# Patient Record
Sex: FEMALE | Race: WHITE | NOT HISPANIC OR LATINO | Employment: FULL TIME | URBAN - METROPOLITAN AREA
[De-identification: names, ages, dates, MRNs, and addresses within clinical notes are randomized per-mention and may not be internally consistent; named-entity substitution may affect disease eponyms.]

---

## 2021-05-06 ENCOUNTER — PATIENT MESSAGE (OUTPATIENT)
Dept: RESEARCH | Facility: HOSPITAL | Age: 36
End: 2021-05-06

## 2022-10-11 ENCOUNTER — OFFICE VISIT (OUTPATIENT)
Dept: URGENT CARE | Facility: CLINIC | Age: 37
End: 2022-10-11
Payer: MEDICAID

## 2022-10-11 VITALS
WEIGHT: 190 LBS | HEIGHT: 63 IN | TEMPERATURE: 98 F | HEART RATE: 92 BPM | DIASTOLIC BLOOD PRESSURE: 81 MMHG | OXYGEN SATURATION: 99 % | RESPIRATION RATE: 16 BRPM | SYSTOLIC BLOOD PRESSURE: 125 MMHG | BODY MASS INDEX: 33.66 KG/M2

## 2022-10-11 DIAGNOSIS — M54.32 SCIATICA OF LEFT SIDE: Primary | ICD-10-CM

## 2022-10-11 PROCEDURE — 3079F PR MOST RECENT DIASTOLIC BLOOD PRESSURE 80-89 MM HG: ICD-10-PCS | Mod: CPTII,S$GLB,, | Performed by: NURSE PRACTITIONER

## 2022-10-11 PROCEDURE — 1159F MED LIST DOCD IN RCRD: CPT | Mod: CPTII,S$GLB,, | Performed by: NURSE PRACTITIONER

## 2022-10-11 PROCEDURE — 3008F BODY MASS INDEX DOCD: CPT | Mod: CPTII,S$GLB,, | Performed by: NURSE PRACTITIONER

## 2022-10-11 PROCEDURE — 3008F PR BODY MASS INDEX (BMI) DOCUMENTED: ICD-10-PCS | Mod: CPTII,S$GLB,, | Performed by: NURSE PRACTITIONER

## 2022-10-11 PROCEDURE — 99204 PR OFFICE/OUTPT VISIT, NEW, LEVL IV, 45-59 MIN: ICD-10-PCS | Mod: S$GLB,,, | Performed by: NURSE PRACTITIONER

## 2022-10-11 PROCEDURE — 1159F PR MEDICATION LIST DOCUMENTED IN MEDICAL RECORD: ICD-10-PCS | Mod: CPTII,S$GLB,, | Performed by: NURSE PRACTITIONER

## 2022-10-11 PROCEDURE — 3074F PR MOST RECENT SYSTOLIC BLOOD PRESSURE < 130 MM HG: ICD-10-PCS | Mod: CPTII,S$GLB,, | Performed by: NURSE PRACTITIONER

## 2022-10-11 PROCEDURE — 3074F SYST BP LT 130 MM HG: CPT | Mod: CPTII,S$GLB,, | Performed by: NURSE PRACTITIONER

## 2022-10-11 PROCEDURE — 99204 OFFICE O/P NEW MOD 45 MIN: CPT | Mod: S$GLB,,, | Performed by: NURSE PRACTITIONER

## 2022-10-11 PROCEDURE — 3079F DIAST BP 80-89 MM HG: CPT | Mod: CPTII,S$GLB,, | Performed by: NURSE PRACTITIONER

## 2022-10-11 RX ORDER — BUTALBITAL, ACETAMINOPHEN AND CAFFEINE 50; 325; 40 MG/1; MG/1; MG/1
1 CAPSULE ORAL EVERY 4 HOURS PRN
COMMUNITY
Start: 2022-08-10

## 2022-10-11 RX ORDER — METHOCARBAMOL 500 MG/1
500 TABLET, FILM COATED ORAL 4 TIMES DAILY
Qty: 20 TABLET | Refills: 0 | Status: SHIPPED | OUTPATIENT
Start: 2022-10-11 | End: 2022-10-21

## 2022-10-11 RX ORDER — KETOROLAC TROMETHAMINE 30 MG/ML
30 INJECTION, SOLUTION INTRAMUSCULAR; INTRAVENOUS
Status: COMPLETED | OUTPATIENT
Start: 2022-10-11 | End: 2022-10-11

## 2022-10-11 RX ORDER — BUPROPION HYDROCHLORIDE 150 MG/1
150 TABLET, EXTENDED RELEASE ORAL 2 TIMES DAILY
COMMUNITY
Start: 2022-10-07

## 2022-10-11 RX ORDER — ERGOCALCIFEROL 1.25 MG/1
50000 CAPSULE ORAL
COMMUNITY
Start: 2022-09-27 | End: 2023-03-09

## 2022-10-11 RX ORDER — KETOROLAC TROMETHAMINE 30 MG/ML
30 INJECTION, SOLUTION INTRAMUSCULAR; INTRAVENOUS
Status: DISCONTINUED | OUTPATIENT
Start: 2022-10-11 | End: 2022-10-11

## 2022-10-11 RX ADMIN — KETOROLAC TROMETHAMINE 30 MG: 30 INJECTION, SOLUTION INTRAMUSCULAR; INTRAVENOUS at 03:10

## 2022-10-11 NOTE — PROGRESS NOTES
"Subjective:       Patient ID: Estefany Parker is a 37 y.o. female.    Vitals:  height is 5' 3" (1.6 m) and weight is 86.2 kg (190 lb). Her tympanic temperature is 98.3 °F (36.8 °C). Her blood pressure is 125/81 and her pulse is 92. Her respiration is 16 and oxygen saturation is 99%.     Chief Complaint: Sciatica    37-year-old female with a history of sciatica reports with complaints of left lower back pain that radiates down left leg.  Patient reports as a pulling type pain that began yesterday morning.  She has been placing both heat and ice to affected area with moderately relief.  Denies changes in bowel, bladder pattern, saddle anesthesia.    Other  This is a chronic problem. The current episode started yesterday. The problem occurs constantly. The problem has been gradually improving. Pertinent negatives include no chest pain, coughing, fever, headaches, nausea, neck pain, numbness or vomiting. Associated symptoms comments: Left lower back down left leg, had issues raising left shoulder, no pain in shoulder, limited range of motion. The symptoms are aggravated by standing and walking (sitting). She has tried heat and ice (aleve back and body) for the symptoms. The treatment provided moderate relief.     Constitution: Negative for activity change, appetite change and fever.   Neck: Negative for neck pain.   Cardiovascular:  Negative for chest pain and palpitations.   Respiratory:  Negative for cough.    Gastrointestinal:  Negative for nausea, vomiting and diarrhea.   Musculoskeletal:  Positive for back pain.   Neurological:  Negative for headaches and numbness.     Objective:      Physical Exam   Constitutional:  Non-toxic appearance. She does not appear ill. No distress. normal  HENT:   Nose: Nose normal.   Mouth/Throat: Mucous membranes are moist.   Pulmonary/Chest: Effort normal. No stridor. No respiratory distress. She has no wheezes. She has no rhonchi. She has no rales. She exhibits no tenderness. "   Abdominal: Bowel sounds are normal.   Musculoskeletal:         General: Tenderness (radiates to left leg) present.   Neurological: no focal deficit. She is alert.   Skin: Skin is not diaphoretic. Capillary refill takes less than 2 seconds.   Nursing note and vitals reviewed.      Assessment:       1. Sciatica of left side          Plan:         Sciatica of left side    Other orders  -     Discontinue: ketorolac injection 30 mg  -     ketorolac injection 30 mg       Sciatica Discharge Instructions   About this topic   You may have pain, weakness, numbness, and tingling that runs from your buttocks down the back of your leg to your feet. This is called sciatica. Your sciatic nerve is a large nerve that starts in your lower back. It runs all the way down the back of your leg. You may have something like a disc or bone spur pressing on this nerve. When something is pressing on or bothering this nerve, it can cause sciatica. This is the medical name for pain, weakness, numbness, or tingling that goes from your buttock down your leg towards your heel. You can have sciatic pain on one side or on both sides. Most of the time, it will get better without surgery.     What care is needed at home?   Ask the doctor what you need to do when you go home. Make sure you ask questions if you do not understand what the doctor says. This way you will know what to do.  Stay as active as you can without causing too much pain. It is OK to rest your back for a day or so. Be sure to get up and move around gently during the day as you are able. After a few days, slowly start to increase your activity level as you are able to. If something causes your pain to come back or get worse, stop and go back to doing easier activities that did not hurt.  Do not sit or  one position for a long time. You may want to sleep with a pillow under or between your knees if this eases your pain.  You may want to take medicines like ibuprofen or  naproxen for swelling and pain. These are nonsteroidal anti-inflammatory drugs (NSAIDS).  What follow-up care is needed?   Your doctor may ask you to make visits to the office to check on your progress. Be sure to keep these visits.  Your doctor may send you to physical therapy (PT) or a chiropractor for treatments to lessen pain and to learn the right exercises to do.  Your doctor may also send you to a neurologist. This is a doctor who specializes in treating nerve problems.  If you do not get better with treatment, your doctor may need to send you to an orthopedic surgeon.  What drugs may be needed?   The doctor may order drugs to:  Help with pain and swelling  The doctor may give you a shot of an anti-inflammatory drug called a corticosteroid. This will help with swelling. Talk with your doctor about the risks of this shot.  Will physical activity be limited?   You may need to rest for a while. You should not do physical activity that makes your health problem worse. Talk to your doctor if you run, work out, or play sports. You may not be able to do those things until your health problem get better.  What problems could happen?   Long-term back pain  Loss of feeling or movement in the legs or feet  Weight gain, less muscle strength and flexibility, weaker bones  Need for surgery  Infection  Loss of bowel and bladder function  What can be done to prevent this health problem?   Stay active and work out to keep your muscles strong and flexible. Warm up slowly and stretch before you exercise.  Use good posture.  Use proper ways to lift and bend:  Spread your feet apart so you have a good base of support. Then, bend with your knees when you  something from the ground.  When lifting and moving an object, keep your back straight. Keep the object as close to your body as possible. Do not twist. Instead, move your feet to the direction you are going.  Take breaks often when seated for long periods of time. Get up  and walk around from time to time.  If you stand for long periods, put one leg up on a small stool for a while. Then, change legs.  If you sleep on your side, put a pillow in between your knees to keep your back and legs in a good position.  Use good supportive footwear. Avoid high heels.  Keep a healthy weight.  When do I need to call the doctor?   You are unable to walk or start having trouble controlling your bowels or bladder.  You get new or worsening pain, numbness, or weakness that spreads to both legs.  Your pain is getting worse, even with medicines and rest.  You are not able do your normal activities because of the pain.  Helpful tips   Water exercise or biking may help you stay in shape without making your problem worse.  The right exercises for sciatica will depend on what the problem is that is causing the pain. Talk with your doctor about which stretches are best for you.  Stretching may be slightly painful but should never give sharp pains. If it is painful, ease up until you only feel mild stretching. All stretching exercises should be held for 20 to 30 seconds to be most helpful. Repeat 2 to 3 times. Do 2 to 3 times each day to get the best results.  Lie on your back. Bend up the knee of the painful side until your foot is even with the other knee. Keeping your shoulders down, slowly drop the bent knee across the other leg. Do this until you feel a stretch in your buttocks.  Lie on your back with your knees bent and feet flat on the ground. If the problem is on your right leg, cross your right ankle onto your left thigh just above the knee. Reach your right arm in between your thighs and clasp your hands around your left thigh. Slowly pull the left thigh up towards your chest until you feel stretching in the right buttock.  Teach Back: Helping You Understand   The Teach Back Method helps you understand the information we are giving you. After you talk with the staff, tell them in your own words  what you learned. This helps to make sure the staff has described each thing clearly. It also helps to explain things that may have been confusing. Before going home, make sure you can do these:  I can tell you about my condition.  I can tell you what may help ease my pain.  I can tell you what I will do if I have more pain or numbness in my leg or foot.  Where can I learn more?   American Academy of Family Physicians  https://familydoctor.org/condition/piriformis-syndrome/   Health Navigator [facility]  https://www.healthnavigator.org.nz/health-a-z/s/sciatica/   [facility]  https://www.nhs.uk/conditions/sciatica/   Last Reviewed Date   2021-06-21  Consumer Information Use and Disclaimer   This information is not specific medical advice and does not replace information you receive from your health care provider. This is only a brief summary of general information. It does NOT include all information about conditions, illnesses, injuries, tests, procedures, treatments, therapies, discharge instructions or life-style choices that may apply to you. You must talk with your health care provider for complete information about your health and treatment options. This information should not be used to decide whether or not to accept your health care providers advice, instructions or recommendations. Only your health care provider has the knowledge and training to provide advice that is right for you.  Copyright   Copyright © 2021 [employer]. and its affiliates and/or licensors. All rights reserved.

## 2022-10-11 NOTE — PATIENT INSTRUCTIONS
Sciatica Discharge Instructions   About this topic   You may have pain, weakness, numbness, and tingling that runs from your buttocks down the back of your leg to your feet. This is called sciatica. Your sciatic nerve is a large nerve that starts in your lower back. It runs all the way down the back of your leg. You may have something like a disc or bone spur pressing on this nerve. When something is pressing on or bothering this nerve, it can cause sciatica. This is the medical name for pain, weakness, numbness, or tingling that goes from your buttock down your leg towards your heel. You can have sciatic pain on one side or on both sides. Most of the time, it will get better without surgery.     What care is needed at home?   Ask the doctor what you need to do when you go home. Make sure you ask questions if you do not understand what the doctor says. This way you will know what to do.  Stay as active as you can without causing too much pain. It is OK to rest your back for a day or so. Be sure to get up and move around gently during the day as you are able. After a few days, slowly start to increase your activity level as you are able to. If something causes your pain to come back or get worse, stop and go back to doing easier activities that did not hurt.  Do not sit or  one position for a long time. You may want to sleep with a pillow under or between your knees if this eases your pain.  You may want to take medicines like ibuprofen or naproxen for swelling and pain. These are nonsteroidal anti-inflammatory drugs (NSAIDS).  What follow-up care is needed?   Your doctor may ask you to make visits to the office to check on your progress. Be sure to keep these visits.  Your doctor may send you to physical therapy (PT) or a chiropractor for treatments to lessen pain and to learn the right exercises to do.  Your doctor may also send you to a neurologist. This is a doctor who specializes in treating nerve  problems.  If you do not get better with treatment, your doctor may need to send you to an orthopedic surgeon.  What drugs may be needed?   The doctor may order drugs to:  Help with pain and swelling  The doctor may give you a shot of an anti-inflammatory drug called a corticosteroid. This will help with swelling. Talk with your doctor about the risks of this shot.  Will physical activity be limited?   You may need to rest for a while. You should not do physical activity that makes your health problem worse. Talk to your doctor if you run, work out, or play sports. You may not be able to do those things until your health problem get better.  What problems could happen?   Long-term back pain  Loss of feeling or movement in the legs or feet  Weight gain, less muscle strength and flexibility, weaker bones  Need for surgery  Infection  Loss of bowel and bladder function  What can be done to prevent this health problem?   Stay active and work out to keep your muscles strong and flexible. Warm up slowly and stretch before you exercise.  Use good posture.  Use proper ways to lift and bend:  Spread your feet apart so you have a good base of support. Then, bend with your knees when you  something from the ground.  When lifting and moving an object, keep your back straight. Keep the object as close to your body as possible. Do not twist. Instead, move your feet to the direction you are going.  Take breaks often when seated for long periods of time. Get up and walk around from time to time.  If you stand for long periods, put one leg up on a small stool for a while. Then, change legs.  If you sleep on your side, put a pillow in between your knees to keep your back and legs in a good position.  Use good supportive footwear. Avoid high heels.  Keep a healthy weight.  When do I need to call the doctor?   You are unable to walk or start having trouble controlling your bowels or bladder.  You get new or worsening pain,  numbness, or weakness that spreads to both legs.  Your pain is getting worse, even with medicines and rest.  You are not able do your normal activities because of the pain.  Helpful tips   Water exercise or biking may help you stay in shape without making your problem worse.  The right exercises for sciatica will depend on what the problem is that is causing the pain. Talk with your doctor about which stretches are best for you.  Stretching may be slightly painful but should never give sharp pains. If it is painful, ease up until you only feel mild stretching. All stretching exercises should be held for 20 to 30 seconds to be most helpful. Repeat 2 to 3 times. Do 2 to 3 times each day to get the best results.  Lie on your back. Bend up the knee of the painful side until your foot is even with the other knee. Keeping your shoulders down, slowly drop the bent knee across the other leg. Do this until you feel a stretch in your buttocks.  Lie on your back with your knees bent and feet flat on the ground. If the problem is on your right leg, cross your right ankle onto your left thigh just above the knee. Reach your right arm in between your thighs and clasp your hands around your left thigh. Slowly pull the left thigh up towards your chest until you feel stretching in the right buttock.  Teach Back: Helping You Understand   The Teach Back Method helps you understand the information we are giving you. After you talk with the staff, tell them in your own words what you learned. This helps to make sure the staff has described each thing clearly. It also helps to explain things that may have been confusing. Before going home, make sure you can do these:  I can tell you about my condition.  I can tell you what may help ease my pain.  I can tell you what I will do if I have more pain or numbness in my leg or foot.  Where can I learn more?   American Academy of Family  Physicians  https://familydoctor.org/condition/piriformis-syndrome/   Health Navigator [place]  https://www.healthnavigator.org.nz/health-a-z/s/sciatica/   [facility]  https://www.nhs.uk/conditions/sciatica/   Last Reviewed Date   2021-06-21  Consumer Information Use and Disclaimer   This information is not specific medical advice and does not replace information you receive from your health care provider. This is only a brief summary of general information. It does NOT include all information about conditions, illnesses, injuries, tests, procedures, treatments, therapies, discharge instructions or life-style choices that may apply to you. You must talk with your health care provider for complete information about your health and treatment options. This information should not be used to decide whether or not to accept your health care providers advice, instructions or recommendations. Only your health care provider has the knowledge and training to provide advice that is right for you.  Copyright   Copyright © 2021 UpToDate, Inc. and its affiliates and/or licensors. All rights reserved.

## 2023-03-09 ENCOUNTER — OFFICE VISIT (OUTPATIENT)
Dept: OBSTETRICS AND GYNECOLOGY | Facility: CLINIC | Age: 38
End: 2023-03-09
Payer: MEDICAID

## 2023-03-09 VITALS
DIASTOLIC BLOOD PRESSURE: 84 MMHG | BODY MASS INDEX: 36 KG/M2 | HEIGHT: 63 IN | RESPIRATION RATE: 14 BRPM | SYSTOLIC BLOOD PRESSURE: 120 MMHG | WEIGHT: 203.19 LBS | HEART RATE: 90 BPM

## 2023-03-09 DIAGNOSIS — Z71.9 ENCOUNTER FOR CONSULTATION: Primary | ICD-10-CM

## 2023-03-09 PROCEDURE — 99203 OFFICE O/P NEW LOW 30 MIN: CPT | Mod: S$PBB,,, | Performed by: STUDENT IN AN ORGANIZED HEALTH CARE EDUCATION/TRAINING PROGRAM

## 2023-03-09 PROCEDURE — 1159F PR MEDICATION LIST DOCUMENTED IN MEDICAL RECORD: ICD-10-PCS | Mod: CPTII,,, | Performed by: STUDENT IN AN ORGANIZED HEALTH CARE EDUCATION/TRAINING PROGRAM

## 2023-03-09 PROCEDURE — 3008F PR BODY MASS INDEX (BMI) DOCUMENTED: ICD-10-PCS | Mod: CPTII,,, | Performed by: STUDENT IN AN ORGANIZED HEALTH CARE EDUCATION/TRAINING PROGRAM

## 2023-03-09 PROCEDURE — 3074F SYST BP LT 130 MM HG: CPT | Mod: CPTII,,, | Performed by: STUDENT IN AN ORGANIZED HEALTH CARE EDUCATION/TRAINING PROGRAM

## 2023-03-09 PROCEDURE — 3079F PR MOST RECENT DIASTOLIC BLOOD PRESSURE 80-89 MM HG: ICD-10-PCS | Mod: CPTII,,, | Performed by: STUDENT IN AN ORGANIZED HEALTH CARE EDUCATION/TRAINING PROGRAM

## 2023-03-09 PROCEDURE — 3008F BODY MASS INDEX DOCD: CPT | Mod: CPTII,,, | Performed by: STUDENT IN AN ORGANIZED HEALTH CARE EDUCATION/TRAINING PROGRAM

## 2023-03-09 PROCEDURE — 99213 OFFICE O/P EST LOW 20 MIN: CPT | Mod: PBBFAC,PO | Performed by: STUDENT IN AN ORGANIZED HEALTH CARE EDUCATION/TRAINING PROGRAM

## 2023-03-09 PROCEDURE — 1159F MED LIST DOCD IN RCRD: CPT | Mod: CPTII,,, | Performed by: STUDENT IN AN ORGANIZED HEALTH CARE EDUCATION/TRAINING PROGRAM

## 2023-03-09 PROCEDURE — 99203 PR OFFICE/OUTPT VISIT, NEW, LEVL III, 30-44 MIN: ICD-10-PCS | Mod: S$PBB,,, | Performed by: STUDENT IN AN ORGANIZED HEALTH CARE EDUCATION/TRAINING PROGRAM

## 2023-03-09 PROCEDURE — 99999 PR PBB SHADOW E&M-EST. PATIENT-LVL III: CPT | Mod: PBBFAC,,, | Performed by: STUDENT IN AN ORGANIZED HEALTH CARE EDUCATION/TRAINING PROGRAM

## 2023-03-09 PROCEDURE — 99999 PR PBB SHADOW E&M-EST. PATIENT-LVL III: ICD-10-PCS | Mod: PBBFAC,,, | Performed by: STUDENT IN AN ORGANIZED HEALTH CARE EDUCATION/TRAINING PROGRAM

## 2023-03-09 PROCEDURE — 3074F PR MOST RECENT SYSTOLIC BLOOD PRESSURE < 130 MM HG: ICD-10-PCS | Mod: CPTII,,, | Performed by: STUDENT IN AN ORGANIZED HEALTH CARE EDUCATION/TRAINING PROGRAM

## 2023-03-09 PROCEDURE — 3079F DIAST BP 80-89 MM HG: CPT | Mod: CPTII,,, | Performed by: STUDENT IN AN ORGANIZED HEALTH CARE EDUCATION/TRAINING PROGRAM

## 2023-03-09 NOTE — PROGRESS NOTES
Subjective:       Patient ID: Estefany Parker is a 37 y.o. female.    Chief Complaint:  Consult (Pt states she had surgery at [facility] in 2015 for endometriosis , was told she did not have endometriosis that she had fibroids at her follow up visit)      History of Present Illness  Patient is a 36 yo  presenting to discuss previous diagnostic laparoscopy performed in . She was unsure what was discovered during surgery and also was confused about previous exam in 2018.     She was unsure if endometriosis was found during the surgery performed and is confused about whether or not she has uterine fibroids after last gyn exam.     She complains of intermittent pelvic pain; she is unsure if it is related to menstrual cycle or if it is present during periods of mild constipation.     Pelvic Pain  The patient's primary symptoms include pelvic pain. This is a recurrent problem. The current episode started more than 1 year ago. The problem occurs daily. The problem has been waxing and waning. The pain is moderate. The problem affects both sides. She is not pregnant. Associated symptoms include abdominal pain, back pain, flank pain, headaches and painful intercourse. Pertinent negatives include no anorexia, chills, constipation, diarrhea, discolored urine, dysuria, fever, frequency, hematuria, nausea, rash, urgency or vomiting. The symptoms are aggravated by intercourse, tactile pressure and menstrual cycle. She has tried heating pad, NSAIDs and warm bath for the symptoms. The treatment provided mild relief. She is sexually active. No, her partner does not have an STD. She uses tubal ligation for contraception. Her menstrual history has been irregular. Her past medical history is significant for an abdominal surgery, endometriosis and ovarian cysts. There is no history of a  section, an ectopic pregnancy, a gynecological surgery, herpes simplex, menorrhagia, metrorrhagia, miscarriage, perineal abscess,  PID, an STD, a terminated pregnancy or vaginosis.       GYN & OB History  Patient's last menstrual period was 2023.   Date of Last Pap: No result found    OB History    Para Term  AB Living   3 3       3   SAB IAB Ectopic Multiple Live Births           3      # Outcome Date GA Lbr Antwon/2nd Weight Sex Delivery Anes PTL Lv   3 Para            2 Para            1 Para                Review of Systems  Review of Systems   Constitutional:  Negative for chills and fever.   Gastrointestinal:  Positive for abdominal pain. Negative for anorexia, constipation, diarrhea, nausea and vomiting.   Genitourinary:  Positive for flank pain and pelvic pain. Negative for dysuria, frequency, hematuria, menorrhagia and urgency.   Musculoskeletal:  Positive for back pain.   Skin:  Negative for rash.   Neurological:  Positive for headaches.         Objective:    Physical Exam:   Constitutional: She is oriented to person, place, and time. She appears well-developed and well-nourished.        Pulmonary/Chest: Effort normal. No respiratory distress.                      Neurological: She is alert and oriented to person, place, and time.    Skin: Skin is warm and dry. No erythema.    Psychiatric: She has a normal mood and affect. Her behavior is normal. Judgment and thought content normal.        Assessment:        1. Encounter for consultation               Plan:      Estefany was seen today for consult.    Diagnoses and all orders for this visit:    Encounter for consultation      - we discussed findings of surgery performed in  and also discussed physical exam performed by OB/gyn in 2018. Answered patient's questions. Recommended beginning to track menstrual cycles to see if pain is related to ovulation or periods. We discussed possible TVUS if symptoms become more persistent or worsen.

## 2023-05-22 ENCOUNTER — OFFICE VISIT (OUTPATIENT)
Dept: URGENT CARE | Facility: CLINIC | Age: 38
End: 2023-05-22
Payer: MEDICAID

## 2023-05-22 VITALS
TEMPERATURE: 98 F | SYSTOLIC BLOOD PRESSURE: 129 MMHG | DIASTOLIC BLOOD PRESSURE: 88 MMHG | OXYGEN SATURATION: 97 % | HEART RATE: 92 BPM | RESPIRATION RATE: 16 BRPM | WEIGHT: 203 LBS | BODY MASS INDEX: 35.97 KG/M2 | HEIGHT: 63 IN

## 2023-05-22 DIAGNOSIS — M54.31 SCIATICA OF RIGHT SIDE: Primary | ICD-10-CM

## 2023-05-22 PROCEDURE — 99214 PR OFFICE/OUTPT VISIT, EST, LEVL IV, 30-39 MIN: ICD-10-PCS | Mod: S$GLB,,, | Performed by: NURSE PRACTITIONER

## 2023-05-22 PROCEDURE — 99214 OFFICE O/P EST MOD 30 MIN: CPT | Mod: S$GLB,,, | Performed by: NURSE PRACTITIONER

## 2023-05-22 RX ORDER — KETOROLAC TROMETHAMINE 30 MG/ML
30 INJECTION, SOLUTION INTRAMUSCULAR; INTRAVENOUS
Status: COMPLETED | OUTPATIENT
Start: 2023-05-22 | End: 2023-05-22

## 2023-05-22 RX ORDER — PREDNISONE 20 MG/1
TABLET ORAL
Qty: 7 TABLET | Refills: 0 | Status: SHIPPED | OUTPATIENT
Start: 2023-05-22

## 2023-05-22 RX ORDER — MELOXICAM 15 MG/1
15 TABLET ORAL DAILY
Qty: 30 TABLET | Refills: 0 | Status: SHIPPED | OUTPATIENT
Start: 2023-05-23 | End: 2023-06-22

## 2023-05-22 RX ADMIN — KETOROLAC TROMETHAMINE 30 MG: 30 INJECTION, SOLUTION INTRAMUSCULAR; INTRAVENOUS at 05:05

## 2023-05-22 NOTE — PATIENT INSTRUCTIONS
BACK PAIN    Take all medications as prescribed.   Do not take other NSAIDs such as Aleve, naproxen, ibuprofen with prescribed meloxicam.  You may take Tylenol in addition to the prescribed medication.    Alternate heat and ice for first 48 hours then  apply heat. You may do gently stretching if tolerable.    Moist warm compresss to area several times daily.  May use a heating pad on LOW to provide heat over a towel which was dampended with warm water. DO NOT FALL ASLEEP WITH HEATING PAD ON.  Do not stay in one position to long.  When sleeping on your back place a pillow under knees to reduce tension on back.  If sleeping on your side, place pillow between knees to keep spine in better alinement.  Wear supportive shoes such as tennis shoes for support of the lower back.  Take any medication as directed.      You should schedule a follow-up appointment with your Primary Care Provider for a recheck in 2-3 days.     If your condition fails to improve in a timely manner, you should receive another evaluation by your Primary Care Provider to discuss your concerns.      If your condition worsens at any time, including but not limited to loss of bowel and/or bladder control, lose sensation between your legs or genital and/or rectal area, lose control or sensation to any extremity, you should report immediately to your nearest Emergency Department for further evaluation. **You must understand that you have received Urgent Care treatment only and that you may be released before all of your medical problems are known or treated. You, the patient, are responsible to arrange for follow-up care as instructed.

## 2023-05-22 NOTE — LETTER
May 22, 2023      [facility]  [address]  [place] [address]  Phone: [phone]  Fax: [phone]       Patient: Estefany Parker   YOB: 1985  Date of Visit: 05/22/2023    To Whom It May Concern:    Jimmie Parker  was at [facility] on 05/22/2023. The patient may return to work/school on 5/23/2023 with restrictions:  No physical strenuous activity or heavy lifting that will cause pain or strain to lower back x1 week and/or symptoms have completely resolved. If you have any questions or concerns, or if I can be of further assistance, please do not hesitate to contact me.    Sincerely,    Janie Swanson NP

## 2025-04-14 ENCOUNTER — OFFICE VISIT (OUTPATIENT)
Dept: UROLOGY | Facility: CLINIC | Age: 40
End: 2025-04-14
Payer: MEDICAID

## 2025-04-14 VITALS — WEIGHT: 172.75 LBS | BODY MASS INDEX: 30.6 KG/M2

## 2025-04-14 DIAGNOSIS — M62.89 PELVIC FLOOR DYSFUNCTION: ICD-10-CM

## 2025-04-14 DIAGNOSIS — R39.89 BLADDER PAIN: Primary | ICD-10-CM

## 2025-04-14 DIAGNOSIS — R39.89 SUSPECTED UTI: ICD-10-CM

## 2025-04-14 DIAGNOSIS — N94.10 DYSPAREUNIA IN FEMALE: ICD-10-CM

## 2025-04-14 PROCEDURE — 99213 OFFICE O/P EST LOW 20 MIN: CPT | Mod: PBBFAC | Performed by: STUDENT IN AN ORGANIZED HEALTH CARE EDUCATION/TRAINING PROGRAM

## 2025-04-14 PROCEDURE — 99203 OFFICE O/P NEW LOW 30 MIN: CPT | Mod: S$PBB,,, | Performed by: STUDENT IN AN ORGANIZED HEALTH CARE EDUCATION/TRAINING PROGRAM

## 2025-04-14 PROCEDURE — 99999 PR PBB SHADOW E&M-EST. PATIENT-LVL III: CPT | Mod: PBBFAC,,, | Performed by: STUDENT IN AN ORGANIZED HEALTH CARE EDUCATION/TRAINING PROGRAM

## 2025-04-14 PROCEDURE — 1159F MED LIST DOCD IN RCRD: CPT | Mod: CPTII,,, | Performed by: STUDENT IN AN ORGANIZED HEALTH CARE EDUCATION/TRAINING PROGRAM

## 2025-04-14 PROCEDURE — 1160F RVW MEDS BY RX/DR IN RCRD: CPT | Mod: CPTII,,, | Performed by: STUDENT IN AN ORGANIZED HEALTH CARE EDUCATION/TRAINING PROGRAM

## 2025-04-14 PROCEDURE — 3008F BODY MASS INDEX DOCD: CPT | Mod: CPTII,,, | Performed by: STUDENT IN AN ORGANIZED HEALTH CARE EDUCATION/TRAINING PROGRAM

## 2025-04-14 NOTE — PROGRESS NOTES
" Patient ID: Estefany Parker is a 40 y.o. female.    Chief Complaint: "Bladder discomfort"  x 6 months    HPI  40 y.o. who presents to the Urology clinic for evaluation of lower abdominal discomfort for past 6 months. Patient describes the pain as a lower abdomina tenderness, burning sensation. She denies dysuria, hematuria. She has had 1 UTI in the last 1 year, symptoms are not similar. She notes tomato sauce and lemonade trigger her symptoms. She notes pain with sexual activity, attributed to her partner's anatomy at that time. Patient without prior urology procedures. Advised to see urology by her OBGYN.       Medically Necessary ROS documented in HPI    Past Medical History  Active Ambulatory Problems     Diagnosis Date Noted    Chronic female pelvic pain 08/27/2014    Dyspareunia 08/27/2014    Chronic pelvic pain in female 01/21/2015     Resolved Ambulatory Problems     Diagnosis Date Noted    No Resolved Ambulatory Problems     Past Medical History:   Diagnosis Date    Abnormal Pap smear of cervix     Anxiety     Depression     Encounter for blood transfusion          Past Surgical History  Past Surgical History:   Procedure Laterality Date    DIAGNOSTIC LAPAROSCOPY  2015    TUBAL LIGATION      2006       Social History       Medications  Current Medications[1]    Allergies  Review of patient's allergies indicates:  No Known Allergies    Patient's PMH, FH, Social hx, Medications, allergies reviewed and updated as pertinent to today's visit    Objective:      Physical Exam  Constitutional:       Appearance: She is well-developed.   HENT:      Head: Normocephalic and atraumatic.   Eyes:      Conjunctiva/sclera: Conjunctivae normal.   Pulmonary:      Effort: Pulmonary effort is normal. No respiratory distress.   Abdominal:      General: Abdomen is flat. There is no distension.      Palpations: Abdomen is soft.      Tenderness: There is no right CVA tenderness or left CVA tenderness.      Comments: Lower abdominal " tenderness   Skin:     General: Skin is warm.      Findings: No rash.   Neurological:      Mental Status: She is alert and oriented to person, place, and time.   Psychiatric:         Behavior: Behavior normal.             Assessment:       1. Bladder pain    2. Dyspareunia in female    3. Pelvic floor dysfunction    4. Suspected UTI        Plan:         Discussed symptoms are highly suggestive of pelvic floor dysfunction especially when considering dyspareunia  Recommend aversion of bladder irritants  Bladder relaxation exercises provided  PFPT recommended, referral placed    Urine/ urinalysis as cystitis suspected, patient to drop off urine at the lab, unable to provide specimen while in office             [1]   Current Outpatient Medications:     fluconazole (DIFLUCAN) 150 MG Tab, 1 po now then in 3-5 days if needed, Disp: 2 tablet, Rfl: 0    nitrofurantoin, macrocrystal-monohydrate, (MACROBID) 100 MG capsule, Take 1 capsule (100 mg total) by mouth 2 (two) times daily., Disp: 10 capsule, Rfl: 0    phenazopyridine (PYRIDIUM) 200 MG tablet, 1 PO tid x 2 days prn, Disp: 6 tablet, Rfl: 0

## 2025-04-14 NOTE — PATIENT INSTRUCTIONS
Avoid known bladder irritants:   Coffee - Regular & Decaf  Tea - caffeinated  Carbonated beverages - cola, non-poppy, diet & caffeine-free  Alcohols - Beer, Red Wine, White Wine, Champagne  Fruits - Grapefruit, Lemon, Orange, Pineapple  Fruit Juices - Cranberry, Grapefruit, Orange, Pineapple  Vegetables - Tomato & Tomato Products  Flavor Enhancers - Hot peppers, Spicy foods, Chili, Horseradish, Vinegar, Monosodium glutamate (MSG)  Artificial Sweeteners - NutraSweet, Sweet N Low, Equal (sweetener), Saccharin